# Patient Record
Sex: FEMALE | Race: AMERICAN INDIAN OR ALASKA NATIVE
[De-identification: names, ages, dates, MRNs, and addresses within clinical notes are randomized per-mention and may not be internally consistent; named-entity substitution may affect disease eponyms.]

---

## 2017-06-13 ENCOUNTER — HOSPITAL ENCOUNTER (EMERGENCY)
Dept: HOSPITAL 5 - ED | Age: 72
Discharge: HOME | End: 2017-06-13
Payer: MEDICARE

## 2017-06-13 VITALS — DIASTOLIC BLOOD PRESSURE: 90 MMHG | SYSTOLIC BLOOD PRESSURE: 180 MMHG

## 2017-06-13 DIAGNOSIS — Z88.8: ICD-10-CM

## 2017-06-13 DIAGNOSIS — E78.00: ICD-10-CM

## 2017-06-13 DIAGNOSIS — I10: ICD-10-CM

## 2017-06-13 DIAGNOSIS — Z87.891: ICD-10-CM

## 2017-06-13 DIAGNOSIS — M25.562: ICD-10-CM

## 2017-06-13 DIAGNOSIS — Z86.73: ICD-10-CM

## 2017-06-13 DIAGNOSIS — M54.2: ICD-10-CM

## 2017-06-13 DIAGNOSIS — G44.319: ICD-10-CM

## 2017-06-13 DIAGNOSIS — S92.425A: Primary | ICD-10-CM

## 2017-06-13 DIAGNOSIS — E11.9: ICD-10-CM

## 2017-06-13 PROCEDURE — 93005 ELECTROCARDIOGRAM TRACING: CPT

## 2017-06-13 PROCEDURE — 72125 CT NECK SPINE W/O DYE: CPT

## 2017-06-13 PROCEDURE — 93010 ELECTROCARDIOGRAM REPORT: CPT

## 2017-06-13 PROCEDURE — 70450 CT HEAD/BRAIN W/O DYE: CPT

## 2017-06-13 PROCEDURE — 82962 GLUCOSE BLOOD TEST: CPT

## 2017-06-13 NOTE — EMERGENCY DEPARTMENT REPORT
Entered by CINTHYA BRENNAN, acting as scribe for JARED ARRIETA NP.


Chief Complaint: Fall


Stated Complaint: FALL/LEFT KNEE AND TOE


Time Seen by Provider: 06/13/17 15:17





- HPI


History of Present Illness: 





70 y/o female c/o fall occurring yesterday while at home. Associated head trauma

, left knee and left big toe pain, but denies dizziness and LOC.





- ROS


Review of Systems: 





+fall


+head trauma


+left knee pain


+left big toe pain





- Exam


Physical Exam: 





GENERAL: The patient is a well-developed, well-nourished, in no apparent 

distress. Patient is alert and oriented x3.


MSE screening note: 


Focused history and physical exam performed.


Due to findings the following was ordered:





ct, xr 





ED Disposition for MSE


Condition: Stable





This documentation as recorded by the scribe,CINTHYA BRENNAN,accurately 

reflects the service I personally performed and the decisions made by me,JARED ARRIETA, NP.

## 2017-06-13 NOTE — XRAY REPORT
FINAL REPORT



PROCEDURE:  XR TOE(S) 1V LT



TECHNIQUE:  LEFT 1st toe radiographs, including AP, oblique and

lateral views.



HISTORY:  L great toe pain sp fall 



COMPARISON:  No prior studies are available for comparison.



FINDINGS:  

Seen on the oblique view, there is linear lucency through the

medial base of the 1st distal phalanx. Cannot exclude

nondisplaced fracture. Correlate for point tenderness. No

evidence of joint dislocation 



IMPRESSION:  

Linear lucency through the base of the 1st distal phalanx, seen

on one view only. Correlate for point tenderness to suggest acute

nondisplaced fracture

## 2017-06-13 NOTE — EMERGENCY DEPARTMENT REPORT
ED Fall HPI





- General


Chief Complaint: Fall


Stated Complaint: FALL/LEFT KNEE AND TOE


Time Seen by Provider: 17 15:17


Source: patient, family


Mode of arrival: Ambulatory


Limitations: No Limitations





- History of Present Illness


Initial Comments: 





Patient here reports fall yesterday.  Denies having any dizziness or blurred 

vision.  Denies loss of consciousness.  She states she hit her head when she 

fell as she was having a headache throughout the day but her headache is gone 

now. head achy  and located in the front of her head.  She also reports neck 

pain to the back of her neck that comes and goes at 3.10.  She reports left 

knee and left great toe pain from this when she fell and it 6 out of 10.  

Denies  nausea..   Vomiting denies any back pain or numbness or tingling to 

extremities.  Patient said that she has severe arthritis all over her body and 

she has an orthopedic doctor that she is following.  She is a diabetic and her 

blood glucose at 283 in triage.


MD Complaint: fall


Onset/Timin


-: days(s)


Fall From: standing


When Fall Occurred: # days PTA (1)


Fall Witnessed: yes, by family


Place Fall Occurred: home


Loss of Consciousness: none


Prolonged Down Time?: no


Symptoms Prior to Fall: none


Location: head


Location - Extremities: Left: Knee (pain), Foot (great toe pain and swelling)


Severity: moderate


Severity scale (0 -10): 6


Quality: aching


Context: tripped/slipped


Associated Symptoms: headache, neck pain.  denies: numbness, weakness, chest 

paint, shortness of breath, abdominal pain, hematuria, unable to walk, 

lightheaded, vertigo, confusion





- Related Data


 Previous Rx's











 Medication  Instructions  Recorded  Last Taken  Type


 


traMADol [Ultram] 50 mg PO Q6HR PRN #20 tablet 17 Unknown Rx











 Allergies











Allergy/AdvReac Type Severity Reaction Status Date / Time


 


lisinopril Allergy  Angioedema Verified 17 15:17














ED Review of Systems


ROS: 


Stated complaint: FALL/LEFT KNEE AND TOE


Other details as noted in HPI





Comment: All other systems reviewed and negative


Constitutional: denies: chills, fever


Eyes: denies: vision change


Respiratory: no symptoms reported


Cardiovascular: denies: chest pain, palpitations, edema, syncope


Gastrointestinal: denies: abdominal pain, nausea, vomiting, diarrhea


Musculoskeletal: joint swelling, arthralgia.  denies: back pain, myalgia


Skin: denies: rash


Neurological: headache, abnormal gait (from fall and pain LT knee and great toe)

.  denies: weakness, numbness, paresthesias, confusion, vertigo





ED Past Medical Hx





- Past Medical History


Previous Medical History?: Yes


Hx Hypertension: Yes


Hx Diabetes: Yes


Additional medical history: HIGH CHOLESTEROL.  THYROID.  TIA





- Surgical History


Past Surgical History?: Yes


Additional Surgical History: HYSTERECTOMY.  RIGHT HEEL SPUR.  FIBROIDS REMOVED.

  LEFT ROTATOR CUFF REPAIN.  THYROIDECTOMY





- Family History


Family history: diabetes, hypertension





- Social History


Smoking Status: Former Smoker


Substance Use Type: None





- Medications


Home Medications: 


 Home Medications











 Medication  Instructions  Recorded  Confirmed  Last Taken  Type


 


traMADol [Ultram] 50 mg PO Q6HR PRN #20 tablet 17  Unknown Rx














ED Physical Exam





- General


Limitations: Physical Limitation


General appearance: alert, in no apparent distress





- Head


Head exam: Present: atraumatic, normocephalic, normal inspection





- Expanded Head Exam


  ** Expanded


Head exam: Absent: laceration, abrasion, contusion, hematoma, racoon eyes, 

clark's sign, general tenderness, tenderness of temporal artery, CSF rhinorrhea

, CSF otorrhea, other





- Eye


Eye exam: Present: normal appearance, PERRL, EOMI.  Absent: periorbital swelling

, periorbital tenderness


Pupils: Present: normal accommodation





- ENT


ENT exam: Present: normal exam, normal orophraynx, mucous membranes moist, TM's 

normal bilaterally, normal external ear exam





- Neck


Neck exam: Present: normal inspection, tenderness, full ROM.  Absent: 

meningismus, lymphadenopathy





- Expanded Neck Exam


  ** Expanded


Neck exam: Absent: tenderness, midline deformity, anterior neck swelling, 

tracheal deviation





- Respiratory


Respiratory exam: Present: normal lung sounds bilaterally.  Absent: respiratory 

distress, chest wall tenderness





- Cardiovascular


Cardiovascular Exam: Present: regular rate, normal rhythm, normal heart sounds





- GI/Abdominal


GI/Abdominal exam: Present: soft, normal bowel sounds.  Absent: distended, 

tenderness, guarding, rebound, rigid





- Extremities Exam


Extremities exam: Present: normal inspection, full ROM, normal capillary 

refill.  Absent: pedal edema, calf tenderness





- Expanded Lower Extremity Exam


  ** Left


Hip exam: Present: normal inspection, full ROM, pelvic stability.  Absent: 

tenderness, swelling, abrasion, laceration, ecchymosis, deformity, crepidus, 

dislocation, erythema, external rotation, internal rotation, shortening


Upper Leg exam: Present: normal inspection, full ROM.  Absent: tenderness, 

swelling, abrasion, laceration, ecchymosis, deformity, crepidus, dislocation, 

erythema


Knee exam: Present: normal inspection, full ROM, tenderness, crepidus, full 

knee extension.  Absent: swelling, abrasion, laceration, ecchymosis, deformity, 

dislocation, erythema, effusion, pain w/ pronation/supination, posterior draw 

sign, pain/laxity with valgus, pain/laxity with varus


Lower Leg exam: Present: normal inspection, full ROM.  Absent: tenderness, 

swelling, abrasion, laceration, ecchymosis, deformity, crepidus, dislocation, 

erythema, palpable cord, Wandy's sign


Ankle exam: Present: normal inspection, full ROM.  Absent: tenderness, swelling

, abrasion, laceration, ecchymosis, deformity, crepidus, dislocation, erythema


Foot/Toe exam: Present: normal inspection, full ROM, tenderness (left great toe 

distal phalanx), swelling (left great toe distal phalanx), ecchymosis (left 

great toe distal phalanx).  Absent: abrasion, laceration, deformity, crepidus, 

dislocation, erythema, amputation, puncture wound, foreign body, calcaneal 

tenderness, tenderness at base of 5th metatarsal, nail avulsion, subungual 

hematoma


Neuro vascular tendon exam: Present: no vascular compromise.  Absent: pulse 

deficit, abnormal cap refill, motor deficit, sensory deficit, tendon deficit, 

extremity cold to touch, pallor, abnormal 2-point discrimination, decreased fine

/light touch, foot drop, peroneal nerve deficit, significant pain with passive 

ROM of distal joint


Gait: Positive: observed and limited by pain





- Back Exam


Back exam: Present: normal inspection, full ROM.  Absent: tenderness, CVA 

tenderness (R), CVA tenderness (L), muscle spasm, paraspinal tenderness, 

vertebral tenderness, rash noted





- Neurological Exam


Neurological exam: Present: alert, oriented X3, abnormal gait (limping due to   

left knee pain and left great toe pain), reflexes normal.  Absent: motor 

sensory deficit





- Expanded Neurological Exam


  ** Expanded


Neurological exam: Absent: innattentive, memory loss-remote event, memory loss-

recent event, ataxia, receptive aphasia, expressive aphasia, total aphasia, 

tremor, protecting the airway


Patient oriented to: Present: person, place, time


Speech: Present: fluid speech


Cranial nerves: EOM's Intact: Normal, Gag Reflex: Normal, Nystagmus: Normal, 

Facial Sensation: Normal


Cerebellar function: Romberg: Normal


Upper motor neuron: Pronator Drift: Normal, Sensory Extinction: Normal


Sensory exam: Upper Extremity Light Touch: Normal, Upper Extremity Temperature: 

Normal, UE 2 Point Discrimination: Normal, Lower Extremity Light Touch: Normal, 

Lower Extremity Temperature: Normal, LE 2 Point Discrimination: Normal


Motor strength exam: RUE: 5, LUE: 5, RLE: 5, LLE: 5


DTR: bicep (R): 2+, bicep (L): 2+, tricep (R): 2+, tricep (L): 2+, knee (R): 2+

, knee (L): 2+, ankle (R): 2+, ankle (L): 2+


Best Eye Response (Turner): (4) open spontaneously


Best Motor Response (Turner): (6) obeys commands


Best Verbal Response (Neftali): (5) oriented


Neftali Total: 15





- Psychiatric


Psychiatric exam: Present: normal affect, normal mood





- Skin


Skin exam: Present: warm, dry, intact, normal color, ecchymosis (ecchymosis 

left great toe distal phalanx).  Absent: cyanosis, pallor





ED Course


 Vital Signs











  17





  15:18


 


Temperature 98.3 F


 


Pulse Rate 57 L


 


Respiratory 18





Rate 


 


Blood Pressure 220/90


 


O2 Sat by Pulse 98





Oximetry 














 Vital Signs











  17





  15:18 20:30


 


Temperature 98.3 F 96.6 F L


 


Pulse Rate 57 L 62


 


Respiratory 18 14





Rate  


 


Blood Pressure 220/90 


 


Blood Pressure  180/90





[Left]  


 


O2 Sat by Pulse 98 97





Oximetry  














- Reevaluation(s)


Reevaluation #1: 





17 20:41


Patient stable .  Manual left arm is 180/90.  On multiple blood pressure 

medication she has not taken today.  She will take her medication when she goes 

home





- Orthopedic Splinting/Casting


  ** Injury #1


Side: left


Lower Extremity Injury Location: toe


Lower Extremity Immobilizer: post-op shoe, elmer tape





ED Medical Decision Making





- Radiology Data


Radiology results: report reviewed





X-ray left great toe reveal linear lucency at the base of the distal phalanx 

and patient with tenderness and ecchymosis to the area.  She had on 

radiographic films nondisplaced fracture


X-ray of left knee reveals arthritis without any acute fracture or dislocation


CT scan C-spine revealed no acute abnormalities.


CT scan of the brain without contrast revealed no acute intracranial 

abnormalities.





- Medical Decision Making





ED course: Patient status post fall yesterday with complaint of head injury 

without loss of consciousness, left knee pain, neck pain and left great toe 

pain.  I discussed x-ray results left knee and left great toe with patient.  

See procedure note for details on splinting of the left great toe.  ACL with 

arthritis of left knee and fracture left great toe distal phalanx.  She also 

complained that she had head injury and CT scan of the head revealed no acute 

findings, complained of neck pain and CT scan of the C-spine revealed no acute 

findings. I  Communicated with the patient.  Pressure elevated at 220/90 in 

triage and upper and discharge is 180/90 and patient did not take her blood 

pressure medication today so she says she will take when she goes home.  Ace 

and had taken an Advil yesterday for pain and said that she did not want 

anything for pain in the emergency room.  Discharged home to follow up with 

podiatrist or primary care physician.  Discharged home with prescription for 

Ultram


Critical care attestation.: 


If time is entered above; I have spent that time in minutes in the direct care 

of this critically ill patient, excluding procedure time.








ED Disposition


Clinical Impression: 


 Arthralgia of multiple sites





Accidental fall


Qualifiers:


 Encounter type: initial encounter Qualified Code(s): W19.XXXA - Unspecified 

fall, initial encounter





Closed fracture of left great toe


Qualifiers:


 Encounter type: initial encounter Phalanx: distal Fracture alignment: 

nondisplaced Qualified Code(s): S92.425A - Nondisplaced fracture of distal 

phalanx of left great toe, initial encounter for closed fracture





Contusion of great toe, left


Qualifiers:


 Encounter type: initial encounter Damage to nail status: with damage Qualified 

Code(s): S90.212A - Contusion of left great toe with damage to nail, initial 

encounter





Headache


Qualifiers:


 Headache type: post-traumatic Headache chronicity pattern: acute headache 

Intractability: not intractable Qualified Code(s): G44.319 - Acute post-

traumatic headache, not intractable





Hypertension


Qualifiers:


 Hypertension type: essential hypertension Qualified Code(s): I10 - Essential (

primary) hypertension





Disposition: - TO HOME OR SELFCARE


Is pt being admited?: No


Does the pt Need Aspirin: No


Condition: Stable


Instructions:  Hypertension (ED), Toe Fracture (ED), Foot Contusion (ED), Acute 

Headache (ED), Minor Head Injury (ED), Fall Prevention for Older Adults (ED)


Additional Instructions: 


Read Discharge instructions and closed head injury.


Follow-up with primary care physician in the morning and


Follow-up with Dr. Bah who is a podiatrist regarding toe fracture and keep 

elmer tape on please see podiatrist


Patient states her blood pressure medication when you get home as her blood 

pressure was elevated


Prescriptions: 


traMADol [Ultram] 50 mg PO Q6HR PRN #20 tablet


 PRN Reason: Pain


Referrals: 


PRIMARY CARE,MD [Primary Care Provider] - 17


BENJA BELTRÁN DPM [Staff Physician] - 06/15/17


Forms:  Accompanied Note

## 2017-06-13 NOTE — CAT SCAN REPORT
FINAL REPORT



EXAM:  CT CERVICAL SPINE WO CON



HISTORY:  neck pain sp fall 



TECHNIQUE:  CT of the cervical spine was performed without

intravenous contrast. 



Reconstructions were included in the coronal and sagittal planes.



PRIORS:  None.



FINDINGS:  

No cervical spine fracture or subluxation.



The prevertebral soft tissues are normal.



Multilevel degenerative changes of the cervical spine are seen

with mild multilevel neural foraminal narrowing. No severe spinal

canal stenosis. Postsurgical findings of prior thyroidectomy are

seen.







IMPRESSION:  

No acute cervical spine fracture or subluxation.

## 2017-06-13 NOTE — CAT SCAN REPORT
FINAL REPORT



EXAM:  CT HEAD/BRAIN WO CON



HISTORY:  pain sp fall 



TECHNIQUE:  CT of the head was performed without intravenous

contrast. 



PRIORS:  None.



FINDINGS:  

The ventricles are normal in shape and position. The ventricles

are nondilated.  No intracranial hemorrhage, mass, mass effect,

midline shift or evidence of acute ischemic infarct. The basilar

cisterns are patent. 



The paranasal sinuses are clear. The extracranial soft tissues

demonstrate no abnormality. The calvarium is intact. The orbits

are intact. The mastoid air cells are clear. 



IMPRESSION:  

No acute intracranial abnormality.

## 2017-06-13 NOTE — XRAY REPORT
LEFT KNEE RADIOGRAPHS



INDICATION: Pain, status post fall.



COMPARISON: None similar.



FINDINGS: AP, lateral and oblique left knee radiographs demonstrate 

moderate degenerative spurring, most involving the medial corners and 

the tibial spines.  Mild to moderate patellofemoral and medial 

compartment narrowing also suspected.  Intact articulation.  Superior 

and inferior patellar enthesophytes.  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  No suprapatellar 

effusion.



CONCLUSION: Moderate left knee osteoarthrosis without acute 

radiographic abnormality, as described.



Thank you for the opportunity to participate in this patient's care.

## 2018-03-19 ENCOUNTER — HOSPITAL ENCOUNTER (EMERGENCY)
Dept: HOSPITAL 5 - ED | Age: 73
Discharge: LEFT BEFORE DISCHARGE | End: 2018-03-19
Payer: MEDICARE

## 2018-03-19 DIAGNOSIS — R42: Primary | ICD-10-CM

## 2018-03-19 DIAGNOSIS — Z53.21: ICD-10-CM
